# Patient Record
Sex: MALE | Race: WHITE
[De-identification: names, ages, dates, MRNs, and addresses within clinical notes are randomized per-mention and may not be internally consistent; named-entity substitution may affect disease eponyms.]

---

## 2017-06-16 ENCOUNTER — HOSPITAL ENCOUNTER (EMERGENCY)
Dept: HOSPITAL 19 - COL.ER | Age: 62
Discharge: HOME | End: 2017-06-16
Payer: COMMERCIAL

## 2017-06-16 VITALS
HEIGHT: 70 IN | WEIGHT: 232.59 LBS | TEMPERATURE: 99 F | DIASTOLIC BLOOD PRESSURE: 79 MMHG | HEART RATE: 77 BPM | BODY MASS INDEX: 33.3 KG/M2 | SYSTOLIC BLOOD PRESSURE: 136 MMHG
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
## 2017-06-16 DIAGNOSIS — Z86.718: ICD-10-CM

## 2017-06-16 DIAGNOSIS — S80.01XA: Primary | ICD-10-CM

## 2017-06-16 DIAGNOSIS — Z79.01: ICD-10-CM

## 2017-06-16 DIAGNOSIS — S09.90XA: ICD-10-CM

## 2019-01-18 ENCOUNTER — HOSPITAL ENCOUNTER (OUTPATIENT)
Dept: HOSPITAL 19 - ZLAB.WCH | Age: 64
End: 2019-01-18
Attending: UROLOGY

## 2019-01-18 ENCOUNTER — HOSPITAL ENCOUNTER (OUTPATIENT)
Dept: HOSPITAL 6 - LAB | Age: 64
End: 2019-01-18
Payer: MEDICARE

## 2019-01-18 DIAGNOSIS — Z85.46: Primary | ICD-10-CM

## 2019-01-18 DIAGNOSIS — Z01.89: Primary | ICD-10-CM

## 2019-01-18 PROCEDURE — G0103 PSA SCREENING: HCPCS

## 2020-01-20 ENCOUNTER — HOSPITAL ENCOUNTER (OUTPATIENT)
Dept: HOSPITAL 6 - LAB | Age: 65
End: 2020-01-20
Payer: MEDICARE

## 2020-01-20 DIAGNOSIS — Z85.46: Primary | ICD-10-CM

## 2021-05-18 ENCOUNTER — HOSPITAL ENCOUNTER (OUTPATIENT)
Dept: HOSPITAL 6 - RAD | Age: 66
End: 2021-05-18
Attending: FAMILY MEDICINE
Payer: MEDICARE

## 2021-05-18 DIAGNOSIS — G31.9: Primary | ICD-10-CM

## 2021-05-18 DIAGNOSIS — I67.82: ICD-10-CM

## 2023-02-10 ENCOUNTER — HOSPITAL ENCOUNTER (INPATIENT)
Dept: HOSPITAL 19 - COL.ER | Age: 68
LOS: 1 days | Discharge: HOME | DRG: 244 | End: 2023-02-11
Attending: HOSPITALIST | Admitting: HOSPITALIST
Payer: MEDICARE

## 2023-02-10 VITALS — DIASTOLIC BLOOD PRESSURE: 72 MMHG | HEART RATE: 30 BPM | SYSTOLIC BLOOD PRESSURE: 134 MMHG

## 2023-02-10 VITALS — DIASTOLIC BLOOD PRESSURE: 72 MMHG | SYSTOLIC BLOOD PRESSURE: 129 MMHG | TEMPERATURE: 98 F | HEART RATE: 58 BPM

## 2023-02-10 VITALS — HEIGHT: 70 IN | WEIGHT: 315 LBS | BODY MASS INDEX: 45.1 KG/M2

## 2023-02-10 VITALS — SYSTOLIC BLOOD PRESSURE: 130 MMHG | DIASTOLIC BLOOD PRESSURE: 78 MMHG | HEART RATE: 70 BPM | TEMPERATURE: 98 F

## 2023-02-10 VITALS — SYSTOLIC BLOOD PRESSURE: 134 MMHG | DIASTOLIC BLOOD PRESSURE: 77 MMHG | HEART RATE: 61 BPM

## 2023-02-10 VITALS — DIASTOLIC BLOOD PRESSURE: 82 MMHG | SYSTOLIC BLOOD PRESSURE: 118 MMHG | HEART RATE: 63 BPM

## 2023-02-10 VITALS — DIASTOLIC BLOOD PRESSURE: 67 MMHG | HEART RATE: 77 BPM | SYSTOLIC BLOOD PRESSURE: 140 MMHG | TEMPERATURE: 98.9 F

## 2023-02-10 VITALS — TEMPERATURE: 98.4 F | DIASTOLIC BLOOD PRESSURE: 82 MMHG | SYSTOLIC BLOOD PRESSURE: 137 MMHG | HEART RATE: 77 BPM

## 2023-02-10 VITALS — HEART RATE: 66 BPM | DIASTOLIC BLOOD PRESSURE: 62 MMHG | SYSTOLIC BLOOD PRESSURE: 135 MMHG

## 2023-02-10 VITALS — SYSTOLIC BLOOD PRESSURE: 144 MMHG | HEART RATE: 67 BPM | DIASTOLIC BLOOD PRESSURE: 82 MMHG

## 2023-02-10 VITALS — HEART RATE: 64 BPM | DIASTOLIC BLOOD PRESSURE: 66 MMHG | SYSTOLIC BLOOD PRESSURE: 122 MMHG

## 2023-02-10 VITALS — HEART RATE: 64 BPM | SYSTOLIC BLOOD PRESSURE: 138 MMHG | DIASTOLIC BLOOD PRESSURE: 80 MMHG

## 2023-02-10 VITALS — HEART RATE: 61 BPM | SYSTOLIC BLOOD PRESSURE: 133 MMHG | DIASTOLIC BLOOD PRESSURE: 67 MMHG

## 2023-02-10 DIAGNOSIS — M25.512: ICD-10-CM

## 2023-02-10 DIAGNOSIS — E03.9: ICD-10-CM

## 2023-02-10 DIAGNOSIS — Z23: ICD-10-CM

## 2023-02-10 DIAGNOSIS — I44.4: ICD-10-CM

## 2023-02-10 DIAGNOSIS — G89.29: ICD-10-CM

## 2023-02-10 DIAGNOSIS — Z85.46: ICD-10-CM

## 2023-02-10 DIAGNOSIS — E78.5: ICD-10-CM

## 2023-02-10 DIAGNOSIS — Z86.718: ICD-10-CM

## 2023-02-10 DIAGNOSIS — Z90.79: ICD-10-CM

## 2023-02-10 DIAGNOSIS — I08.3: ICD-10-CM

## 2023-02-10 DIAGNOSIS — F10.90: ICD-10-CM

## 2023-02-10 DIAGNOSIS — K21.9: ICD-10-CM

## 2023-02-10 DIAGNOSIS — M19.90: ICD-10-CM

## 2023-02-10 DIAGNOSIS — Z79.01: ICD-10-CM

## 2023-02-10 DIAGNOSIS — I44.2: Primary | ICD-10-CM

## 2023-02-10 DIAGNOSIS — Z88.0: ICD-10-CM

## 2023-02-10 LAB
ALBUMIN SERPL-MCNC: 4.1 GM/DL (ref 3.4–4.8)
ALP SERPL-CCNC: 76 U/L (ref 40–150)
ALT SERPL-CCNC: 24 U/L (ref 0–55)
ANION GAP SERPL CALC-SCNC: 11 MMOL/L (ref 7–16)
AST SERPL-CCNC: 26 U/L (ref 5–34)
BASOPHILS # BLD: 0.1 K/MM3 (ref 0–0.2)
BASOPHILS NFR BLD AUTO: 1.1 % (ref 0–2)
BILIRUB SERPL-MCNC: 1.2 MG/DL (ref 0.2–1.2)
BUN SERPL-MCNC: 16 MG/DL (ref 8–26)
CALCIUM SERPL-MCNC: 9.4 MG/DL (ref 8.4–10.2)
CHLORIDE SERPL-SCNC: 107 MMOL/L (ref 98–107)
CO2 SERPL-SCNC: 22 MMOL/L (ref 23–31)
CREAT SERPL-SCNC: 0.96 MG/DL (ref 0.72–1.25)
EOSINOPHIL # BLD: 0.1 K/MM3 (ref 0–0.7)
EOSINOPHIL NFR BLD: 1.3 % (ref 0–4)
ERYTHROCYTE [DISTWIDTH] IN BLOOD BY AUTOMATED COUNT: 12.9 % (ref 11.5–14.5)
GLUCOSE SERPL-MCNC: 132 MG/DL (ref 70–99)
GRANULOCYTES # BLD AUTO: 59.4 % (ref 42.2–75.2)
HCT VFR BLD AUTO: 49.6 % (ref 42–52)
HGB BLD-MCNC: 16.7 G/DL (ref 13.5–18)
LYMPHOCYTES # BLD: 1.6 K/MM3 (ref 1.2–3.4)
LYMPHOCYTES NFR BLD: 28.5 % (ref 20–51)
MCH RBC QN AUTO: 33 PG (ref 27–31)
MCHC RBC AUTO-ENTMCNC: 34 G/DL (ref 33–37)
MCV RBC AUTO: 98 FL (ref 80–100)
MONOCYTES # BLD: 0.5 K/MM3 (ref 0.1–0.6)
MONOCYTES NFR BLD AUTO: 9.5 % (ref 1.7–9.3)
NEUTROPHILS # BLD: 3.3 K/MM3 (ref 1.4–6.5)
PLATELET # BLD AUTO: 171 K/MM3 (ref 130–400)
PMV BLD AUTO: 10.5 FL (ref 7.4–10.4)
POTASSIUM SERPL-SCNC: 4.2 MMOL/L (ref 3.5–4.5)
PROT SERPL-MCNC: 7.2 GM/DL (ref 6.2–8.1)
RBC # BLD AUTO: 5.07 M/MM3 (ref 4.2–5.6)
SODIUM SERPL-SCNC: 140 MMOL/L (ref 136–145)
TROPONIN I SERPL-MCNC: 0.04 NG/ML (ref 0–0.03)

## 2023-02-10 PROCEDURE — C1898 LEAD, PMKR, OTHER THAN TRANS: HCPCS

## 2023-02-10 PROCEDURE — C1894 INTRO/SHEATH, NON-LASER: HCPCS

## 2023-02-10 PROCEDURE — 02HK3JZ INSERTION OF PACEMAKER LEAD INTO RIGHT VENTRICLE, PERCUTANEOUS APPROACH: ICD-10-PCS | Performed by: INTERNAL MEDICINE

## 2023-02-10 PROCEDURE — 02H63JZ INSERTION OF PACEMAKER LEAD INTO RIGHT ATRIUM, PERCUTANEOUS APPROACH: ICD-10-PCS | Performed by: INTERNAL MEDICINE

## 2023-02-10 PROCEDURE — 0JH606Z INSERTION OF PACEMAKER, DUAL CHAMBER INTO CHEST SUBCUTANEOUS TISSUE AND FASCIA, OPEN APPROACH: ICD-10-PCS | Performed by: INTERNAL MEDICINE

## 2023-02-10 PROCEDURE — C1785 PMKR, DUAL, RATE-RESP: HCPCS

## 2023-02-10 NOTE — NUR
Pt arrives to room 207 via bed.  Is A&O x 4, VSS, RA, afebrile.  Incision to L
chest covered with surgical dressing.  Dressing CDI, pt denies pain,
surrounding tissue without pain.  Applied ice to surgical site.  Sling in
place to L arm.  Pt able to move all extremeties.  Is educated to L arm
restrictions and acknowledges understanding.  Telemetry monitor in place, SR
60's.  Mild edema noted to BLE.  Pt ambulates with SBA to BR.

## 2023-02-11 VITALS — HEART RATE: 69 BPM | SYSTOLIC BLOOD PRESSURE: 113 MMHG | DIASTOLIC BLOOD PRESSURE: 49 MMHG | TEMPERATURE: 98.6 F

## 2023-02-11 VITALS — SYSTOLIC BLOOD PRESSURE: 132 MMHG | DIASTOLIC BLOOD PRESSURE: 73 MMHG | HEART RATE: 69 BPM | TEMPERATURE: 99.1 F

## 2023-02-11 VITALS — SYSTOLIC BLOOD PRESSURE: 143 MMHG | HEART RATE: 72 BPM | TEMPERATURE: 98.9 F | DIASTOLIC BLOOD PRESSURE: 72 MMHG

## 2023-02-11 VITALS — SYSTOLIC BLOOD PRESSURE: 126 MMHG | DIASTOLIC BLOOD PRESSURE: 68 MMHG | HEART RATE: 66 BPM | TEMPERATURE: 98.5 F

## 2023-02-11 LAB
ANION GAP SERPL CALC-SCNC: 9 MMOL/L (ref 7–16)
BASOPHILS # BLD: 0.1 K/MM3 (ref 0–0.2)
BASOPHILS NFR BLD AUTO: 0.6 % (ref 0–2)
BUN SERPL-MCNC: 15 MG/DL (ref 8–26)
CALCIUM SERPL-MCNC: 8.4 MG/DL (ref 8.4–10.2)
CHLORIDE SERPL-SCNC: 110 MMOL/L (ref 98–107)
CO2 SERPL-SCNC: 21 MMOL/L (ref 23–31)
CREAT SERPL-SCNC: 0.81 MG/DL (ref 0.72–1.25)
EOSINOPHIL # BLD: 0 K/MM3 (ref 0–0.7)
EOSINOPHIL NFR BLD: 0.2 % (ref 0–4)
ERYTHROCYTE [DISTWIDTH] IN BLOOD BY AUTOMATED COUNT: 13.1 % (ref 11.5–14.5)
GLUCOSE SERPL-MCNC: 100 MG/DL (ref 70–99)
GRANULOCYTES # BLD AUTO: 76.1 % (ref 42.2–75.2)
HCT VFR BLD AUTO: 47.3 % (ref 42–52)
HGB BLD-MCNC: 15.3 G/DL (ref 13.5–18)
LYMPHOCYTES # BLD: 1.1 K/MM3 (ref 1.2–3.4)
LYMPHOCYTES NFR BLD: 13.1 % (ref 20–51)
MCH RBC QN AUTO: 33 PG (ref 27–31)
MCHC RBC AUTO-ENTMCNC: 32 G/DL (ref 33–37)
MCV RBC AUTO: 101 FL (ref 80–100)
MONOCYTES # BLD: 0.8 K/MM3 (ref 0.1–0.6)
MONOCYTES NFR BLD AUTO: 9.9 % (ref 1.7–9.3)
NEUTROPHILS # BLD: 6.2 K/MM3 (ref 1.4–6.5)
PLATELET # BLD AUTO: 143 K/MM3 (ref 130–400)
PMV BLD AUTO: 11.1 FL (ref 7.4–10.4)
POTASSIUM SERPL-SCNC: 4.2 MMOL/L (ref 3.5–4.5)
RBC # BLD AUTO: 4.69 M/MM3 (ref 4.2–5.6)
SODIUM SERPL-SCNC: 140 MMOL/L (ref 136–145)

## 2023-02-11 NOTE — NUR
Shift assessment completed around 2030. A&Ox4. No pain or discomfort. Slightly
tender on incision site left upper arm. Dressing is CDI. Sling on. Tele on.
RFA INT is CDI. No other needs or concerns were expressed at this time.
Belongings and call light are within reach.

## 2023-02-11 NOTE — NUR
SW met with patient to complete intake. Patient provides that he lives in
[facility] with his wife Xiao [phone]. Patient states that he does not
utilize DME, is independent with ADL's and does not utilize HH services at
this time. PCP is Dr. Lincoln, and pharmacy is [facility]. Patient's wife is DPOA/HC
and he plans to return to his home upon DC. SW will continue to follow.
 
DC plan: home

## 2023-02-11 NOTE — NUR
PATIENT DISCHARGED VIA WHEELCHAIR WITH NURSE. RECEIVED DISCHARGE INSTRUCTIONS
WITH MEDICATIONS AND FOLLOW UP APPOINTMENTS. ACKNOWLEDGED UNDERSTANDING. NO
ISSUES.

## 2023-02-11 NOTE — NUR
Initial visit:  Pt was resting and content with wife by his side.  Pt has no
needs right now.   will follow up as needed